# Patient Record
Sex: MALE | Race: BLACK OR AFRICAN AMERICAN | URBAN - NONMETROPOLITAN AREA
[De-identification: names, ages, dates, MRNs, and addresses within clinical notes are randomized per-mention and may not be internally consistent; named-entity substitution may affect disease eponyms.]

---

## 2024-08-05 ENCOUNTER — OFFICE (OUTPATIENT)
Dept: URBAN - NONMETROPOLITAN AREA CLINIC 5 | Facility: CLINIC | Age: 54
End: 2024-08-05
Payer: OTHER GOVERNMENT

## 2024-08-05 VITALS
WEIGHT: 190 LBS | RESPIRATION RATE: 18 BRPM | HEIGHT: 69 IN | HEART RATE: 86 BPM | SYSTOLIC BLOOD PRESSURE: 173 MMHG | DIASTOLIC BLOOD PRESSURE: 96 MMHG

## 2024-08-05 DIAGNOSIS — R68.81 EARLY SATIETY: ICD-10-CM

## 2024-08-05 DIAGNOSIS — R11.0 NAUSEA: ICD-10-CM

## 2024-08-05 DIAGNOSIS — R12 HEARTBURN: ICD-10-CM

## 2024-08-05 DIAGNOSIS — R63.0 ANOREXIA: ICD-10-CM

## 2024-08-05 PROCEDURE — 99204 OFFICE O/P NEW MOD 45 MIN: CPT | Performed by: INTERNAL MEDICINE

## 2024-08-05 NOTE — SERVICENOTES
Given patient's nausea, heartburn, decreased appetite, and early satiety do suspect possible gastroparesis and will proceed with EGD to rule out other etiology contributing.  Will also proceed with gastric emptying scan.  Counseled him on a gastroparesis diet.  Counseled him to remain on his omeprazole every morning 30-45 minutes prior to breakfast and Pepcid at bedtime.  Counseled him on conservative measures to avoid reflux as outlined above.  Will plan to have follow up with the nurse practitioner in 12 weeks to assess symptoms.  If gastric emptying scan is positive would consider domperidone or Reglan.

## 2024-08-05 NOTE — SERVICEHPINOTES
Ricardo Luu   is a   52 yo  male   with a past medical history of DM 2, hypertension, and obesity presents to establish care for heartburn, nausea, early satiety, and  decreased appetite.  Patient reports he has been having a burning pain in his stomach as well as significant gas.  He reports that he can only eat small amounts until he feels full.  He has significant nausea and feels sick to his stomach.  He reports associated weight loss.  He does smoke and occasionally drinks alcohol.  He denies illicit drug use or NSAID use.  He is concerned he may have an ulcer seen or other etiology contributing.  He is unclear what his last A1c was.  He does note his diabetes has previously been uncontrolled.  He has never had an EGD or gastric emptying scan.  He has never attempted a gastroparesis diet.  He is on omeprazole every morning prior to breakfast and Pepcid at bedtime.

## 2024-09-12 PROBLEM — R13.10 DYSPHAGIA: Status: ACTIVE | Noted: 2024-09-12

## 2024-09-12 PROBLEM — K31.89 OTHER DISEASES OF STOMACH AND DUODENUM: Status: ACTIVE | Noted: 2024-09-12

## 2025-01-14 ENCOUNTER — OFFICE (OUTPATIENT)
Dept: URBAN - NONMETROPOLITAN AREA CLINIC 5 | Facility: CLINIC | Age: 55
End: 2025-01-14
Payer: OTHER GOVERNMENT

## 2025-01-14 VITALS
SYSTOLIC BLOOD PRESSURE: 147 MMHG | DIASTOLIC BLOOD PRESSURE: 81 MMHG | HEIGHT: 69 IN | WEIGHT: 198 LBS | HEART RATE: 71 BPM

## 2025-01-14 DIAGNOSIS — Z12.11 ENCOUNTER FOR SCREENING FOR MALIGNANT NEOPLASM OF COLON: ICD-10-CM

## 2025-01-14 DIAGNOSIS — K21.9 GASTRO-ESOPHAGEAL REFLUX DISEASE WITHOUT ESOPHAGITIS: ICD-10-CM

## 2025-01-14 PROCEDURE — 99213 OFFICE O/P EST LOW 20 MIN: CPT | Performed by: NURSE PRACTITIONER
